# Patient Record
Sex: FEMALE | Race: WHITE | Employment: OTHER
[De-identification: names, ages, dates, MRNs, and addresses within clinical notes are randomized per-mention and may not be internally consistent; named-entity substitution may affect disease eponyms.]

---

## 2024-04-30 ENCOUNTER — HOSPITAL ENCOUNTER (OUTPATIENT)
Facility: HOSPITAL | Age: 84
Discharge: HOME | End: 2024-05-03
Payer: MEDICARE

## 2024-04-30 DIAGNOSIS — R60.9 EDEMA, UNSPECIFIED TYPE: ICD-10-CM

## 2024-04-30 PROCEDURE — 93970 EXTREMITY STUDY: CPT

## 2024-05-21 ENCOUNTER — HOSPITAL ENCOUNTER (OUTPATIENT)
Facility: HOSPITAL | Age: 84
Discharge: HOME | End: 2024-05-24
Payer: MEDICARE

## 2024-05-21 DIAGNOSIS — H92.01 RIGHT EAR PAIN: ICD-10-CM

## 2024-05-21 DIAGNOSIS — R51.9 RECURRENT HEADACHE: ICD-10-CM

## 2024-05-21 PROCEDURE — 70450 CT HEAD/BRAIN W/O DYE: CPT

## 2024-07-01 ENCOUNTER — TRANSCRIBE ORDERS (OUTPATIENT)
Facility: HOSPITAL | Age: 84
End: 2024-07-01

## 2024-07-01 ENCOUNTER — HOSPITAL ENCOUNTER (OUTPATIENT)
Facility: HOSPITAL | Age: 84
Discharge: HOME | End: 2024-07-04
Payer: MEDICARE

## 2024-07-01 DIAGNOSIS — M79.672 LEFT FOOT PAIN: Primary | ICD-10-CM

## 2024-07-01 DIAGNOSIS — M79.672 PAIN OF LEFT FOOT: Primary | ICD-10-CM

## 2024-07-01 DIAGNOSIS — M79.672 LEFT FOOT PAIN: ICD-10-CM

## 2024-07-01 DIAGNOSIS — M79.672 ACUTE FOOT PAIN, LEFT: Primary | ICD-10-CM

## 2024-07-01 PROCEDURE — 73630 X-RAY EXAM OF FOOT: CPT

## 2024-08-28 ENCOUNTER — TRANSCRIBE ORDERS (OUTPATIENT)
Facility: HOSPITAL | Age: 84
End: 2024-08-28

## 2024-08-28 DIAGNOSIS — Z85.3 HISTORY OF BREAST CANCER: ICD-10-CM

## 2024-08-28 DIAGNOSIS — Z90.11 STATUS POST RIGHT MASTECTOMY: Primary | ICD-10-CM

## 2024-11-11 ENCOUNTER — HOSPITAL ENCOUNTER (OUTPATIENT)
Facility: HOSPITAL | Age: 84
Discharge: HOME | End: 2024-11-14
Payer: MEDICARE

## 2024-11-11 VITALS — WEIGHT: 155 LBS | BODY MASS INDEX: 29.27 KG/M2 | HEIGHT: 61 IN

## 2024-11-11 DIAGNOSIS — Z90.11 STATUS POST RIGHT MASTECTOMY: ICD-10-CM

## 2024-11-11 DIAGNOSIS — Z85.3 HISTORY OF BREAST CANCER: ICD-10-CM

## 2024-11-11 PROCEDURE — 77063 BREAST TOMOSYNTHESIS BI: CPT

## 2025-02-12 ENCOUNTER — TRANSCRIBE ORDERS (OUTPATIENT)
Facility: HOSPITAL | Age: 85
End: 2025-02-12

## 2025-02-12 DIAGNOSIS — R60.0 EDEMA OF LEFT LOWER EXTREMITY: Primary | ICD-10-CM

## 2025-02-20 ENCOUNTER — TELEPHONE (OUTPATIENT)
Age: 85
End: 2025-02-20

## 2025-02-20 NOTE — TELEPHONE ENCOUNTER
Received a referral from Kelton Cuellar for new patient appointment w/Dr. Gao. Called patient, spoke w/spouse, patient was not at home. I advised him that patient can call office.

## 2025-03-31 ENCOUNTER — HOSPITAL ENCOUNTER (OUTPATIENT)
Facility: HOSPITAL | Age: 85
Discharge: HOME | End: 2025-04-03
Payer: MEDICARE

## 2025-03-31 DIAGNOSIS — R60.0 EDEMA OF LEFT LOWER EXTREMITY: ICD-10-CM

## 2025-03-31 PROCEDURE — 93971 EXTREMITY STUDY: CPT
